# Patient Record
(demographics unavailable — no encounter records)

---

## 2025-07-28 NOTE — PHYSICAL EXAM
[TextEntry] :   GENERAL: Appears in no acute distress HEENT: EOMI. No conjunctival erythema. Moist mucous membranes. No nasopharyngeal ulcers NECK: Supple, no cervical lymphadenopathy CARDIOVASCULAR: RRR. S1, S2 auscultated. PULMONARY: Clear to auscultation b/l,  ABDOMINAL: Soft, nontender, nondistended.  MSK: No active synovitis, swelling, erythema, or warmth. Mild fulness/bony hypertrophy noted of both knees No Bouchards or Heberdens nodes. No deformities. knee crepitus. Normal ROM of neck, back, b/l upper and lower extremities. SKIN: No lesions or rashes NEURO: No focal deficits. Motor strength 5/5 in major muscle groups of b/l UE and LE. PSYCH:. Normal affect and thought process.

## 2025-07-28 NOTE — ASSESSMENT
[FreeTextEntry1] : 63 yr old M with hx of DM, HLD, knee OA, lumbar radiculopathy   Self referred for knee pain - reports he has knee pain 10-15 years  Has seen an ortho who did xrays and told patient he has knee OA He was given gel injections and told to PT Reports this was done in March 2025 Patient reports he did PT which helped a little He was also given meloxicam by his ortho  Patient's knee pain is likely 2/2 to OA. He denies joint swelling/inflammatory arthritis His sister has RA -Can check for RA. Labs given. Offered appt to go over results however patient would like a phone call - If concerns for RA on lab, will need to follow up with me. Otherwise, continue follow up with ortho. He reports he was told he might need surgery for OA. He has taken meloxicam, done PT, and also reports getting gel injections    Total time spent in review of patient history, clinical exam, management, counseling, and plan of care:  47min

## 2025-07-28 NOTE — HISTORY OF PRESENT ILLNESS
[FreeTextEntry1] : 63 yr old M with hx of DM, HLD, knee OA, lumbar radiculopathy   Self referred for knee pain - reports he has knee pain 10-15 years  Has seen an ortho who did xrays and told patient he has knee OA He was given gel injections and told to PT Reports this was done in March 2025 Patient reports he did PT which helped a little He was also given meloxicam by his ortho   Patient denies joint swelling, joint erythema/warmth, fatigue, fever, chills, weight loss, nasopharyngeal ulcers, chest pain, abdominal pain, , cough, SOB, nausea, vomiting, diarrhea, , blood in stool, dysuria, , rash, photosensitivity, Raynaud's dry eyes, dry mouth, eye pain/redness, myalgias, muscle weakness, dysphagia, numbness/tingling, , Hx of DVT/PEs.     PMHx: As above Family Hx: reports sister has RA Social Hx:  Smoking Hx: denies EtOH Hx: social Occupation: works in restaurant as